# Patient Record
Sex: FEMALE | Race: BLACK OR AFRICAN AMERICAN | NOT HISPANIC OR LATINO | Employment: OTHER | URBAN - METROPOLITAN AREA
[De-identification: names, ages, dates, MRNs, and addresses within clinical notes are randomized per-mention and may not be internally consistent; named-entity substitution may affect disease eponyms.]

---

## 2020-09-02 ENCOUNTER — HOSPITAL ENCOUNTER (EMERGENCY)
Facility: HOSPITAL | Age: 85
Discharge: HOME | End: 2020-09-02
Attending: EMERGENCY MEDICINE | Admitting: EMERGENCY MEDICINE
Payer: MEDICARE

## 2020-09-02 VITALS
HEART RATE: 98 BPM | OXYGEN SATURATION: 96 % | TEMPERATURE: 98 F | BODY MASS INDEX: 24.2 KG/M2 | SYSTOLIC BLOOD PRESSURE: 110 MMHG | WEIGHT: 131.5 LBS | DIASTOLIC BLOOD PRESSURE: 74 MMHG | RESPIRATION RATE: 16 BRPM | HEIGHT: 62 IN

## 2020-09-02 DIAGNOSIS — R11.2 NON-INTRACTABLE VOMITING WITH NAUSEA, UNSPECIFIED VOMITING TYPE: Primary | ICD-10-CM

## 2020-09-02 DIAGNOSIS — R10.84 GENERALIZED ABDOMINAL PAIN: ICD-10-CM

## 2020-09-02 DIAGNOSIS — K63.89 MESENTERIC MASS: ICD-10-CM

## 2020-09-02 PROBLEM — R19.00 ABDOMINAL MASS: Status: ACTIVE | Noted: 2020-09-02

## 2020-09-02 PROBLEM — I48.91 ATRIAL FIBRILLATION: Status: ACTIVE | Noted: 2020-09-02

## 2020-09-02 LAB
ALBUMIN SERPL BCP-MCNC: 2.9 G/DL (ref 3.5–5.2)
ALP SERPL-CCNC: 133 U/L (ref 55–135)
ALT SERPL W/O P-5'-P-CCNC: 13 U/L (ref 10–44)
ANION GAP SERPL CALC-SCNC: 11 MMOL/L (ref 8–16)
ANISOCYTOSIS BLD QL SMEAR: SLIGHT
AST SERPL-CCNC: 27 U/L (ref 10–40)
BASOPHILS # BLD AUTO: 0.02 K/UL (ref 0–0.2)
BASOPHILS NFR BLD: 0.4 % (ref 0–1.9)
BILIRUB SERPL-MCNC: 1.5 MG/DL (ref 0.1–1)
BUN SERPL-MCNC: 15 MG/DL (ref 10–30)
BURR CELLS BLD QL SMEAR: ABNORMAL
CALCIUM SERPL-MCNC: 8.6 MG/DL (ref 8.7–10.5)
CHLORIDE SERPL-SCNC: 102 MMOL/L (ref 95–110)
CO2 SERPL-SCNC: 21 MMOL/L (ref 23–29)
CREAT SERPL-MCNC: 1 MG/DL (ref 0.5–1.4)
DACRYOCYTES BLD QL SMEAR: ABNORMAL
DIFFERENTIAL METHOD: ABNORMAL
EOSINOPHIL # BLD AUTO: 0 K/UL (ref 0–0.5)
EOSINOPHIL NFR BLD: 0.8 % (ref 0–8)
ERYTHROCYTE [DISTWIDTH] IN BLOOD BY AUTOMATED COUNT: 13.2 % (ref 11.5–14.5)
EST. GFR  (AFRICAN AMERICAN): 55 ML/MIN/1.73 M^2
EST. GFR  (NON AFRICAN AMERICAN): 48 ML/MIN/1.73 M^2
GLUCOSE SERPL-MCNC: 79 MG/DL (ref 70–110)
HCT VFR BLD AUTO: 33.2 % (ref 37–48.5)
HGB BLD-MCNC: 10.7 G/DL (ref 12–16)
HYPOCHROMIA BLD QL SMEAR: ABNORMAL
IMM GRANULOCYTES # BLD AUTO: 0.02 K/UL (ref 0–0.04)
IMM GRANULOCYTES NFR BLD AUTO: 0.4 % (ref 0–0.5)
LIPASE SERPL-CCNC: 10 U/L (ref 4–60)
LYMPHOCYTES # BLD AUTO: 0.8 K/UL (ref 1–4.8)
LYMPHOCYTES NFR BLD: 16.9 % (ref 18–48)
MCH RBC QN AUTO: 29.2 PG (ref 27–31)
MCHC RBC AUTO-ENTMCNC: 32.2 G/DL (ref 32–36)
MCV RBC AUTO: 91 FL (ref 82–98)
MONOCYTES # BLD AUTO: 0.5 K/UL (ref 0.3–1)
MONOCYTES NFR BLD: 9.5 % (ref 4–15)
NEUTROPHILS # BLD AUTO: 3.4 K/UL (ref 1.8–7.7)
NEUTROPHILS NFR BLD: 72 % (ref 38–73)
NRBC BLD-RTO: 0 /100 WBC
OVALOCYTES BLD QL SMEAR: ABNORMAL
PLATELET # BLD AUTO: 106 K/UL (ref 150–350)
PLATELET BLD QL SMEAR: ABNORMAL
PMV BLD AUTO: 12.9 FL (ref 9.2–12.9)
POIKILOCYTOSIS BLD QL SMEAR: SLIGHT
POTASSIUM SERPL-SCNC: 3.9 MMOL/L (ref 3.5–5.1)
PROT SERPL-MCNC: 6.8 G/DL (ref 6–8.4)
RBC # BLD AUTO: 3.66 M/UL (ref 4–5.4)
SODIUM SERPL-SCNC: 134 MMOL/L (ref 136–145)
TARGETS BLD QL SMEAR: ABNORMAL
WBC # BLD AUTO: 4.72 K/UL (ref 3.9–12.7)

## 2020-09-02 PROCEDURE — A9698 NON-RAD CONTRAST MATERIALNOC: HCPCS | Performed by: EMERGENCY MEDICINE

## 2020-09-02 PROCEDURE — 83690 ASSAY OF LIPASE: CPT

## 2020-09-02 PROCEDURE — 99285 EMERGENCY DEPT VISIT HI MDM: CPT | Mod: 25

## 2020-09-02 PROCEDURE — 99284 PR EMERGENCY DEPT VISIT,LEVEL IV: ICD-10-PCS | Mod: ,,, | Performed by: SURGERY

## 2020-09-02 PROCEDURE — 63600175 PHARM REV CODE 636 W HCPCS: Performed by: EMERGENCY MEDICINE

## 2020-09-02 PROCEDURE — 96375 TX/PRO/DX INJ NEW DRUG ADDON: CPT

## 2020-09-02 PROCEDURE — 96376 TX/PRO/DX INJ SAME DRUG ADON: CPT

## 2020-09-02 PROCEDURE — 25000003 PHARM REV CODE 250: Performed by: EMERGENCY MEDICINE

## 2020-09-02 PROCEDURE — 25500020 PHARM REV CODE 255: Performed by: EMERGENCY MEDICINE

## 2020-09-02 PROCEDURE — 85025 COMPLETE CBC W/AUTO DIFF WBC: CPT

## 2020-09-02 PROCEDURE — 96361 HYDRATE IV INFUSION ADD-ON: CPT

## 2020-09-02 PROCEDURE — 80053 COMPREHEN METABOLIC PANEL: CPT

## 2020-09-02 PROCEDURE — 99284 EMERGENCY DEPT VISIT MOD MDM: CPT | Mod: ,,, | Performed by: SURGERY

## 2020-09-02 PROCEDURE — 96374 THER/PROPH/DIAG INJ IV PUSH: CPT

## 2020-09-02 RX ORDER — FUROSEMIDE 20 MG/1
20 TABLET ORAL 2 TIMES DAILY
COMMUNITY

## 2020-09-02 RX ORDER — METOPROLOL SUCCINATE 25 MG/1
25 TABLET, EXTENDED RELEASE ORAL DAILY
COMMUNITY

## 2020-09-02 RX ORDER — PANTOPRAZOLE SODIUM 40 MG/1
40 TABLET, DELAYED RELEASE ORAL DAILY
COMMUNITY

## 2020-09-02 RX ORDER — MORPHINE SULFATE 4 MG/ML
4 INJECTION, SOLUTION INTRAMUSCULAR; INTRAVENOUS
Status: COMPLETED | OUTPATIENT
Start: 2020-09-02 | End: 2020-09-02

## 2020-09-02 RX ORDER — ONDANSETRON 2 MG/ML
4 INJECTION INTRAMUSCULAR; INTRAVENOUS
Status: COMPLETED | OUTPATIENT
Start: 2020-09-02 | End: 2020-09-02

## 2020-09-02 RX ORDER — ONDANSETRON 4 MG/1
4 TABLET, FILM COATED ORAL EVERY 6 HOURS
Qty: 30 TABLET | Refills: 0 | Status: SHIPPED | OUTPATIENT
Start: 2020-09-02

## 2020-09-02 RX ORDER — MORPHINE SULFATE 4 MG/ML
2 INJECTION, SOLUTION INTRAMUSCULAR; INTRAVENOUS
Status: DISCONTINUED | OUTPATIENT
Start: 2020-09-02 | End: 2020-09-02 | Stop reason: HOSPADM

## 2020-09-02 RX ORDER — HYDROCODONE BITARTRATE AND ACETAMINOPHEN 5; 325 MG/1; MG/1
1 TABLET ORAL EVERY 4 HOURS PRN
Qty: 30 TABLET | Refills: 0 | Status: SHIPPED | OUTPATIENT
Start: 2020-09-02

## 2020-09-02 RX ADMIN — IOHEXOL 1000 ML: 9 SOLUTION ORAL at 04:09

## 2020-09-02 RX ADMIN — ONDANSETRON 4 MG: 2 INJECTION INTRAMUSCULAR; INTRAVENOUS at 12:09

## 2020-09-02 RX ADMIN — MORPHINE SULFATE 4 MG: 4 INJECTION, SOLUTION INTRAMUSCULAR; INTRAVENOUS at 12:09

## 2020-09-02 RX ADMIN — ONDANSETRON 4 MG: 2 INJECTION INTRAMUSCULAR; INTRAVENOUS at 04:09

## 2020-09-02 RX ADMIN — SODIUM CHLORIDE 500 ML: 0.9 INJECTION, SOLUTION INTRAVENOUS at 04:09

## 2020-09-02 NOTE — ED NOTES
Patient states she contacted her daughter and they are attempting to contact her son to come pick her up. She ambulated to bathroom but states abdomen is starting to hurt again.

## 2020-09-02 NOTE — HOSPITAL COURSE
Patient was evaluated here with a CT scan.  The hospital Medicine service requested a surgical consult because of her history of a nonresectable intra-abdominal tumor

## 2020-09-02 NOTE — ED PROVIDER NOTES
SCRIBE #1 NOTE: I, Jaime Gaines, am scribing for, and in the presence of, Isaias Brooks Jr., MD. I have scribed the HPI, ROS, PEx.     SCRIBE #2 NOTE: I, Jessie Nathan, am scribing for, and in the presence of,  Demetrius Simmons Do, MD. I have scribed the remaining portions of the note not scribed by Scribe #1.      History     Chief Complaint   Patient presents with    Abdominal Pain     NVx 3-5 days     Review of patient's allergies indicates:  No Known Allergies      History of Present Illness     HPI    9/2/2020, 4:32 AM  History obtained from the EMS and patient      History of Present Illness: Lauryn Pineda is a 89 y.o. female patient who presents to the Emergency Department for evaluation of abdominal pain which onset 3-5 days ago. Pt reports going into A-fib last week.  Pt reports she has not had a bowel movement in several days. Pt was transferred to this ED from [facility] for a CT abdomen. Concern of bowel obstruction. Symptoms are constant and moderate in severity. No mitigating or exacerbating factors reported. Associated sxs include n/v. Patient denies any fever, chills, CP, SOB, back pain, dysuria, and all other sxs at this time. No further complaints or concerns at this time.  While at the other hospital, the patient had an episode of atrial fibrillation with rapid ventricular response.  The patient has a prior history of atrial fibrillation.  Patient also has a history of a nonoperable mass in her abdomen for many years that is not cancer per patient.      Arrival mode: [facility]I    PCP: Billy Black MD        Past Medical History:  Past Medical History:   Diagnosis Date    A-fib     HTN (hypertension)        Past Surgical History:  History reviewed. No pertinent surgical history.      Family History:  History reviewed. No pertinent family history.    Social History:  History reviewed. No pertinent social history.      Review of Systems     Review of Systems    Constitutional: Negative for activity change, appetite change, chills, diaphoresis and fever.   HENT: Negative for congestion, drooling, ear pain, mouth sores, rhinorrhea, sinus pain, sore throat and trouble swallowing.    Eyes: Negative for pain and discharge.   Respiratory: Negative for cough, chest tightness, shortness of breath, wheezing and stridor.    Cardiovascular: Negative for chest pain, palpitations and leg swelling.   Gastrointestinal: Positive for abdominal pain, constipation, nausea and vomiting. Negative for abdominal distention, blood in stool and diarrhea.   Genitourinary: Negative for difficulty urinating, dysuria, flank pain, frequency, hematuria and urgency.   Musculoskeletal: Negative for arthralgias, back pain and myalgias.   Skin: Negative for pallor, rash and wound.   Neurological: Negative for dizziness, syncope, weakness, light-headedness and numbness.   All other systems reviewed and are negative.       Physical Exam     Initial Vitals [09/02/20 0422]   BP Pulse Resp Temp SpO2   119/78 101 18 98.7 °F (37.1 °C) 100 %      MAP       --          Physical Exam  Nursing Notes and Vital Signs Reviewed.  Constitutional: Patient is in no acute distress. Well-developed and well-nourished.  Head: Atraumatic. Normocephalic.  Eyes: PERRL. EOM intact. Conjunctivae are not pale. No scleral icterus.  ENT: Mucous membranes are moist. Oropharynx is clear and symmetric.    Neck: Supple. Full ROM. No lymphadenopathy.  Cardiovascular: Regular rate. Regular rhythm. No murmurs, rubs, or gallops. Distal pulses are 2+ and symmetric.  Pulmonary/Chest: No respiratory distress. Clear to auscultation bilaterally. No wheezing or rales.  Abdominal: Soft and non-distended.  There is mild generalized tenderness.  No rebound, guarding, or rigidity. Good bowel sounds.  Genitourinary: No CVA tenderness.  No suprapubic tenderness  Musculoskeletal: Moves all extremities. No obvious deformities. No edema. No calf  "tenderness.  Skin: Warm and dry.  No rash or cellulitis.  Neurological:  Alert, awake, and appropriate.  Normal speech.  No acute focal neurological deficits are appreciated.  Psychiatric: Normal affect. Good eye contact. Appropriate in content.     ED Course   Procedures  ED Vital Signs:  Vitals:    09/02/20 0422 09/02/20 0431 09/02/20 0456 09/02/20 0500   BP: 119/78   117/77   Pulse: 101   103   Resp: 18  18 18   Temp: 98.7 °F (37.1 °C)      TempSrc: Oral      SpO2: 100%   100%   Weight:  59.6 kg (131 lb 8 oz)     Height: 5' 2" (1.575 m)       09/02/20 0600 09/02/20 0901 09/02/20 0917 09/02/20 1222   BP: 128/85 117/70     Pulse: 99 96 100    Resp: 16   14   Temp:       TempSrc:       SpO2: 96% 99% 96%    Weight:       Height:        09/02/20 1226   BP: 108/71   Pulse: 102   Resp: 14   Temp:    TempSrc:    SpO2: 96%   Weight:    Height:        Abnormal Lab Results:  Labs Reviewed   CBC W/ AUTO DIFFERENTIAL - Abnormal; Notable for the following components:       Result Value    RBC 3.66 (*)     Hemoglobin 10.7 (*)     Hematocrit 33.2 (*)     Platelets 106 (*)     Lymph # 0.8 (*)     Lymph% 16.9 (*)     All other components within normal limits   COMPREHENSIVE METABOLIC PANEL - Abnormal; Notable for the following components:    Sodium 134 (*)     CO2 21 (*)     Calcium 8.6 (*)     Albumin 2.9 (*)     Total Bilirubin 1.5 (*)     eGFR if  55 (*)     eGFR if non  48 (*)     All other components within normal limits   LIPASE        All Lab Results:  Results for orders placed or performed during the hospital encounter of 09/02/20   CBC auto differential   Result Value Ref Range    WBC 4.72 3.90 - 12.70 K/uL    RBC 3.66 (L) 4.00 - 5.40 M/uL    Hemoglobin 10.7 (L) 12.0 - 16.0 g/dL    Hematocrit 33.2 (L) 37.0 - 48.5 %    Mean Corpuscular Volume 91 82 - 98 fL    Mean Corpuscular Hemoglobin 29.2 27.0 - 31.0 pg    Mean Corpuscular Hemoglobin Conc 32.2 32.0 - 36.0 g/dL    RDW 13.2 11.5 - 14.5 %    " Platelets 106 (L) 150 - 350 K/uL    MPV 12.9 9.2 - 12.9 fL    Immature Granulocytes 0.4 0.0 - 0.5 %    Gran # (ANC) 3.4 1.8 - 7.7 K/uL    Immature Grans (Abs) 0.02 0.00 - 0.04 K/uL    Lymph # 0.8 (L) 1.0 - 4.8 K/uL    Mono # 0.5 0.3 - 1.0 K/uL    Eos # 0.0 0.0 - 0.5 K/uL    Baso # 0.02 0.00 - 0.20 K/uL    nRBC 0 0 /100 WBC    Gran% 72.0 38.0 - 73.0 %    Lymph% 16.9 (L) 18.0 - 48.0 %    Mono% 9.5 4.0 - 15.0 %    Eosinophil% 0.8 0.0 - 8.0 %    Basophil% 0.4 0.0 - 1.9 %    Platelet Estimate Appears normal     Aniso Slight     Poik Slight     Hypo Occasional     Ovalocytes Occasional     Target Cells Occasional     Tear Drop Cells Occasional     Aston Cells Occasional     Differential Method Automated    Comprehensive metabolic panel   Result Value Ref Range    Sodium 134 (L) 136 - 145 mmol/L    Potassium 3.9 3.5 - 5.1 mmol/L    Chloride 102 95 - 110 mmol/L    CO2 21 (L) 23 - 29 mmol/L    Glucose 79 70 - 110 mg/dL    BUN, Bld 15 10 - 30 mg/dL    Creatinine 1.0 0.5 - 1.4 mg/dL    Calcium 8.6 (L) 8.7 - 10.5 mg/dL    Total Protein 6.8 6.0 - 8.4 g/dL    Albumin 2.9 (L) 3.5 - 5.2 g/dL    Total Bilirubin 1.5 (H) 0.1 - 1.0 mg/dL    Alkaline Phosphatase 133 55 - 135 U/L    AST 27 10 - 40 U/L    ALT 13 10 - 44 U/L    Anion Gap 11 8 - 16 mmol/L    eGFR if African American 55 (A) >60 mL/min/1.73 m^2    eGFR if non African American 48 (A) >60 mL/min/1.73 m^2   Lipase   Result Value Ref Range    Lipase 10 4 - 60 U/L         Imaging Results:  Imaging Results          CT Abdomen Pelvis  Without Contrast (Final result)  Result time 09/02/20 07:32:08    Final result by Saturnino Erazo MD (09/02/20 07:32:08)                 Impression:      Mesenteric mass noted measuring is 3.9 x 2.8 cm 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    Unclear involvement or invasion of the portal vein. Prominence in the region of the pancreatic head is noted thought to reflect extension of the mass or adenopathy. Extensive lymph nodes are seen throughout the  mesentery.  Recommend follow-up with IV contrast.    Moderate mucosal thickening throughout small bowel loops within the mid and lower abdomen with prominence of proximal small bowel loops concerning for early bowel obstruction related to mesenteric mass.  Mucosal thickening can be seen with inflammation or infiltration.    All CT scans at this facility use dose modulation, iterative reconstruction, and/or weight based dosing when appropriate to reduce radiation dose to as low as reasonable achievable.      Electronically signed by: Saturnino Erazo MD  Date:    09/02/2020  Time:    07:32             Narrative:    EXAMINATION:  CT ABDOMEN PELVIS WITHOUT CONTRAST    CLINICAL HISTORY:  Bowel obstruction high-grade suspected;    TECHNIQUE:  Low dose axial images, sagittal and coronal reformations were obtained from the lung bases to the pubic symphysis.  30 mL of oral Omnipaque 350 was administered.    COMPARISON:  None    FINDINGS:  Heart: Cardiomegaly.  Aortic valve and mitral annular calcifications.  No pericardial effusion.    Lung Bases: Interlobular septal thickening and small right and trace left pleural effusions.  Findings concerning for mild pulmonary edema.  Scarring and bronchiectasis noted at the right lung base.    Liver: Liver is normal in size.    Gallbladder: Mild wall thickening.  Layering stones or sludge suspected.    Bile Ducts: No dilatation.    Pancreas: No obvious mass. No peripancreatic fat stranding.    Spleen: Normal.    Adrenals: Normal.    Kidneys/Ureters: Multiple indeterminate hypodensities within the kidneys measuring up to 1.6 cm on the left.  Recommend follow-up ultrasound.  No mass, hydroureteronephrosis, or nephroureterolithiasis.    Bladder: No wall thickening.    GI Tract/Mesentery: Small hiatal hernia.  Moderate mucosal thickening throughout small bowel loops within the mid and lower abdomen with prominence of proximal small bowel loops concerning for early bowel obstruction.   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  Unclear involvement or invasion of the portal vein.  Prominence in the region of the pancreatic head is noted thought to reflect extension of the mass or adenopathy.  Extensive lymph nodes are seen throughout the mesentery.  Moderate constipation is seen within the colon.    Peritoneal Space: Moderate amount of ascites scattered throughout the abdomen.    Retroperitoneum: No significant adenopathy.    Abdominal wall: Mild diffuse anasarca.    Vasculature: No aneurysm. Aorta demonstrates severe atherosclerotic disease. Tortuosity of the descending aorta can be seen with chronic hypertension.    Bones: No acute fracture.  Diffuse osteopenia with heterogeneous attenuation throughout..  Advanced degenerative changes and diffuse osteopenia throughout the visualized spine.  Posterior disc bulges noted from L3/4 through L5/S1 producing mild canal stenosis and mild to moderate bilateral neural foraminal narrowing at each level.  No suspicious lytic or sclerotic lesions.                                          The Emergency Provider reviewed the vital signs and test results, which are outlined above.     ED Discussion       6:00 AM: Dr. Brooks transfers care of patient to Dr. Georges pending imaging results.    8:30 AM: Re-evaluated pt. Pt is resting comfortably and is in no acute distress. D/w pt all pertinent results. D/w pt any concerns expressed at this time. Answered all questions. Pt expresses understanding at this time.    8:50 AM: Discussed pt's case with Dr. Bill (hospital medicine) who recommends consulting general surgery for evaluation and treatment recommendations.    9:35 AM: Dr. aRpp (general surgery) will evaluate pt at bedside.    11:18 AM Dr. Rapp is evaluating pt at bedside.     11:25 AM  Dr. Rapp states that pt has an unchanged, nonresectable tumor in which he cannot operate on. He does not think is causing obstruction  "but she does have some thickened bowel wall. He advises giving the pt clear fluids and contacting hospital medicine. Will notify hospital medicine and suggest GI consult and PO challenge. Per Dr. Rapp, " The tumor was unresectable and remains unresectable and unless she has another tremaine indication for surgery there are no plans for any surgical procedures during this admission."    11:46 AM: Re-evaluated pt. Pt is resting comfortably and is in no acute distress. D/w pt all pertinent results.  Pt was able to tolerate contrast with no complications or vomiting. Pt does not believe or wants surgery. She is aware of the severity of her condition. Pt states that she realizes the tumor may increase in size and cause more severe complications, including potential death. D/w pt any concerns expressed at this time. Answered all questions. Pt expresses understanding at this time. Pt states that she is ready to go home.    12:15 AM Pt is wailing in ED room. Pt reports increased abdominal pain after attempting to use the restroom. Will order pain medication.  However, she wants to go home. She does not want surgery.      2:51 PM  Patient is still resting comfortably.  She is no longer in any abdominal pain.  She was able to eat a diet and has not been vomiting.  We are still waiting for her son to get here from [place]         Medical Decision Making:   Clinical Tests:   Lab Tests: Ordered and Reviewed  Radiological Study: Ordered and Reviewed           ED Medication(s):  Medications   morphine injection 2 mg (2 mg Intravenous Not Given 9/2/20 0456)   sodium chloride 0.9% bolus 500 mL (0 mLs Intravenous Stopped 9/2/20 0912)   ondansetron injection 4 mg (4 mg Intravenous Given 9/2/20 0456)   iohexoL (OMNIPAQUE 9) oral solution 1,000 mL (1,000 mLs Oral Given 9/2/20 0450)   morphine injection 4 mg (4 mg Intravenous Given 9/2/20 1222)   ondansetron injection 4 mg (4 mg Intravenous Given 9/2/20 1223)       New " Prescriptions    No medications on file               Scribe Attestation:   Scribe #1: I performed the above scribed service and the documentation accurately describes the services I performed. I attest to the accuracy of the note.     Attending:   Physician Attestation Statement for Scribe #1: I, Isaias Brooks Jr., MD, personally performed the services described in this documentation, as scribed by Jaime Gaines, in my presence, and it is both accurate and complete.       Scribe Attestation:   Scribe #2: I performed the above scribed service and the documentation accurately describes the services I performed. I attest to the accuracy of the note.    Attending Attestation:           Physician Attestation for Scribe:    Physician Attestation Statement for Scribe #2: I, Demetrius Simmons Do, MD, reviewed documentation, as scribed by Jessie Nathan in my presence, and it is both accurate and complete. I also acknowledge and confirm the content of the note done by Scribe #1.           Clinical Impression       ICD-10-CM ICD-9-CM   1. Non-intractable vomiting with nausea, unspecified vomiting type  R11.2 787.01   2. Generalized abdominal pain  R10.84 789.07   3. Mesenteric mass  K63.89 568.89       Disposition:   Disposition: Discharged  Condition: Stable         Demetrius Simmons Do, MD  09/02/20 1544

## 2020-09-02 NOTE — CONSULTS
[facility] -   General Surgery  Consult Note    Patient Name: Lauryn Pineda  MRN: [id number]  Code Status: No Order  Admission Date: 9/2/2020  Hospital Length of Stay: 0 days  Attending Physician: Demetrius Simmons Do, MD  Primary Care Provider: Billy Black MD    Patient information was obtained from patient, Records from [place] and ER records.         In summary    Surgical consult for nonresectable mass.  There is not appear to be tremaine evidence of a bowel obstruction.  Would recommend clear liquid diet and repeat imaging.    No plans for surgery unless the patient has a no other tremaine indication for operative intervention other than her non resect abdominal mass    Consults  Subjective:     Principal Problem: <principal problem not specified>    History of Present Illness: Patient was transferred to our emergency room from the emergency room in [place].  She presented to the emergency room In [place]  with a 3 day history of lower abdominal pain.  She says that the pain is crampy in nature.  It is associated with nausea but no vomiting at this time.  She has had intermittent episodes of abdominal pain over the years that have been associated with nausea and vomiting.  These usually resolve spontaneously.    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  She was evaluated in [place] approximately 3 years ago and it was determined that the tumor should not be resected because of the nature in the tumor in her age and comorbidities    No current facility-administered medications on file prior to encounter.      Current Outpatient Medications on File Prior to Encounter   Medication Sig    furosemide (LASIX) 20 MG tablet Take 20 mg by mouth 2 (two) times daily.    metoprolol succinate (TOPROL-XL) 25 MG 24 hr tablet Take 25 mg by mouth once daily.    pantoprazole (PROTONIX) 40 MG tablet Take 40 mg by mouth once daily.       Review of  patient's allergies indicates:  No Known Allergies    Past Medical History:   Diagnosis Date    A-fib     HTN (hypertension)      History reviewed. No pertinent surgical history.  Family History     None        Tobacco Use    Smoking status: Not on file   Substance and Sexual Activity    Alcohol use: Not on file    Drug use: Not on file    Sexual activity: Not on file     Review of Systems   Constitutional: Positive for fatigue. Negative for appetite change, chills, fever and unexpected weight change.   HENT: Negative for hearing loss and rhinorrhea.    Eyes: Negative for visual disturbance.   Respiratory: Negative for apnea, cough, shortness of breath and wheezing.    Cardiovascular: Positive for leg swelling. Negative for chest pain and palpitations.   Gastrointestinal: Positive for abdominal pain, constipation and nausea. Negative for abdominal distention, blood in stool, diarrhea and vomiting.   Genitourinary: Negative for dysuria, frequency and urgency.   Musculoskeletal: Negative for arthralgias and neck pain.   Skin: Negative for rash.   Neurological: Negative for seizures, weakness, numbness and headaches.   Hematological: Negative for adenopathy. Does not bruise/bleed easily.   Psychiatric/Behavioral: Negative for hallucinations. The patient is not nervous/anxious.      Objective:     Vital Signs (Most Recent):  Temp: 98.7 °F (37.1 °C) (09/02/20 0422)  Pulse: 100 (09/02/20 0917)  Resp: 16 (09/02/20 0600)  BP: 117/70 (09/02/20 0901)  SpO2: 96 % (09/02/20 0917) Vital Signs (24h Range):  Temp:  [98.6 °F (37 °C)-98.7 °F (37.1 °C)] 98.7 °F (37.1 °C)  Pulse:  [] 100  Resp:  [16-18] 16  SpO2:  [96 %-100 %] 96 %  BP: (117-128)/(70-87) 117/70     Weight: 59.6 kg (131 lb 8 oz)  Body mass index is 24.05 kg/m².    Physical Exam  Vitals signs and nursing note reviewed.   Constitutional:       Comments: Frail somewhat cachectic   HENT:      Head:      Comments: Temporal wasting     Nose: Nose normal.   Eyes:       General: No scleral icterus.     Pupils: Pupils are equal, round, and reactive to light.   Cardiovascular:      Rate and Rhythm: Normal rate and regular rhythm.      Pulses: Normal pulses.   Pulmonary:      Effort: Pulmonary effort is normal.      Breath sounds: Normal breath sounds.   Abdominal:      General: Abdomen is flat. Bowel sounds are normal. There is no distension.      Palpations: Abdomen is soft.      Tenderness: There is no abdominal tenderness.      Hernia: No hernia is present.   Musculoskeletal:      Right lower leg: Edema (With the appearance of venous stasis changes) present.      Left lower leg: Edema ( with the appearance of venous stasis change) present.   Skin:     General: Skin is warm and dry.   Neurological:      Mental Status: She is alert.         Significant Labs:  CBC:   Recent Labs   Lab 09/02/20 0452   WBC 4.72   RBC 3.66*   HGB 10.7*   HCT 33.2*   *   MCV 91   MCH 29.2   MCHC 32.2     BMP:   Recent Labs   Lab 09/02/20 0452   GLU 79   *   K 3.9      CO2 21*   BUN 15   CREATININE 1.0   CALCIUM 8.6*     CMP:   Recent Labs   Lab 09/02/20  0452   GLU 79   CALCIUM 8.6*   ALBUMIN 2.9*   PROT 6.8   *   K 3.9   CO2 21*      BUN 15   CREATININE 1.0   ALKPHOS 133   ALT 13   AST 27   BILITOT 1.5*       Significant Diagnostics:      CT ABDOMEN PELVIS WITHOUT CONTRAST     CLINICAL HISTORY:  Bowel obstruction high-grade suspected;     TECHNIQUE:  Low dose axial images, sagittal and coronal reformations were obtained from the lung bases to the pubic symphysis.  30 mL of oral Omnipaque 350 was administered.     COMPARISON:  None     FINDINGS:  Heart: Cardiomegaly.  Aortic valve and mitral annular calcifications.  No pericardial effusion.     Lung Bases: Interlobular septal thickening and small right and trace left pleural effusions.  Findings concerning for mild pulmonary edema.  Scarring and bronchiectasis noted at the right lung base.     Liver: Liver is normal  in size.     Gallbladder: Mild wall thickening.  Layering stones or sludge suspected.     Bile Ducts: No dilatation.     Pancreas: No obvious mass. No peripancreatic fat stranding.     Spleen: Normal.     Adrenals: Normal.     Kidneys/Ureters: Multiple indeterminate hypodensities within the kidneys measuring up to 1.6 cm on the left.  Recommend follow-up ultrasound.  No mass, hydroureteronephrosis, or nephroureterolithiasis.     Bladder: No wall thickening.     GI Tract/Mesentery: Small hiatal hernia.  Moderate mucosal thickening throughout small bowel loops within the mid and lower abdomen with prominence of proximal small bowel loops concerning for early bowel obstruction.  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  Unclear involvement or invasion of the portal vein.  Prominence in the region of the pancreatic head is noted thought to reflect extension of the mass or adenopathy.  Extensive lymph nodes are seen throughout the mesentery.  Moderate constipation is seen within the colon.     Peritoneal Space: Moderate amount of ascites scattered throughout the abdomen.     Retroperitoneum: No significant adenopathy.     Abdominal wall: Mild diffuse anasarca.     Vasculature: No aneurysm. Aorta demonstrates severe atherosclerotic disease. Tortuosity of the descending aorta can be seen with chronic hypertension.     Bones: No acute fracture.  Diffuse osteopenia with heterogeneous attenuation throughout..  Advanced degenerative changes and diffuse osteopenia throughout the visualized spine.  Posterior disc bulges noted from L3/4 through L5/S1 producing mild canal stenosis and mild to moderate bilateral neural foraminal narrowing at each level.  No suspicious lytic or sclerotic lesions.     Impression:     Mesenteric mass noted measuring is 3.9 x 2.8 cm 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     Unclear involvement or invasion of the portal vein. Prominence in the region of the pancreatic  head is noted thought to reflect extension of the mass or adenopathy. Extensive lymph nodes are seen throughout the mesentery.  Recommend follow-up with IV contrast.     Moderate mucosal thickening throughout small bowel loops within the mid and lower abdomen with prominence of proximal small bowel loops concerning for early bowel obstruction related to mesenteric mass.  Mucosal thickening can be seen with inflammation or infiltration.     All CT scans at this facility use dose modulation, iterative reconstruction, and/or weight based dosing when appropriate to reduce radiation dose to as low as reasonable achievable.        Electronically signed by: Saturnino Erazo MD  Date:                                            09/02/2020    Assessment/Plan:     Atrial fibrillation  Atrial fibrillation was present on the EKG from [facility]    Abdominal mass  Patient was deemed to have a non receptacle intra-abdominal mass.  At this point I would agree with that assessment given the nature of the mass in her comorbidities.  There does not appear to be a tremaine bowel obstruction and no surgical intervention is recommended at this point.    I would recommend the patient be started on a clear liquid diet with abdominal films in the morning.  Biochemical evaluation for carcinoid and/or other sources of intra-abdominal masses can be ended taking if deemed necessary    Consideration can be given to a Gastroenterology consult.          VTE Risk Mitigation (From admission, onward)    None          Thank you for your consult. I will follow-up with patient. Please contact us if you have any additional questions.    Jason Rapp MD  General Surgery  [facility] -

## 2020-09-02 NOTE — SUBJECTIVE & OBJECTIVE
No current facility-administered medications on file prior to encounter.      Current Outpatient Medications on File Prior to Encounter   Medication Sig    furosemide (LASIX) 20 MG tablet Take 20 mg by mouth 2 (two) times daily.    metoprolol succinate (TOPROL-XL) 25 MG 24 hr tablet Take 25 mg by mouth once daily.    pantoprazole (PROTONIX) 40 MG tablet Take 40 mg by mouth once daily.       Review of patient's allergies indicates:  No Known Allergies    Past Medical History:   Diagnosis Date    A-fib     HTN (hypertension)      History reviewed. No pertinent surgical history.  Family History     None        Tobacco Use    Smoking status: Not on file   Substance and Sexual Activity    Alcohol use: Not on file    Drug use: Not on file    Sexual activity: Not on file     Review of Systems   Constitutional: Positive for fatigue. Negative for appetite change, chills, fever and unexpected weight change.   HENT: Negative for hearing loss and rhinorrhea.    Eyes: Negative for visual disturbance.   Respiratory: Negative for apnea, cough, shortness of breath and wheezing.    Cardiovascular: Positive for leg swelling. Negative for chest pain and palpitations.   Gastrointestinal: Positive for abdominal pain, constipation and nausea. Negative for abdominal distention, blood in stool, diarrhea and vomiting.   Genitourinary: Negative for dysuria, frequency and urgency.   Musculoskeletal: Negative for arthralgias and neck pain.   Skin: Negative for rash.   Neurological: Negative for seizures, weakness, numbness and headaches.   Hematological: Negative for adenopathy. Does not bruise/bleed easily.   Psychiatric/Behavioral: Negative for hallucinations. The patient is not nervous/anxious.      Objective:     Vital Signs (Most Recent):  Temp: 98.7 °F (37.1 °C) (09/02/20 0422)  Pulse: 100 (09/02/20 0917)  Resp: 16 (09/02/20 0600)  BP: 117/70 (09/02/20 0901)  SpO2: 96 % (09/02/20 0917) Vital Signs (24h Range):  Temp:  [98.6 °F  (37 °C)-98.7 °F (37.1 °C)] 98.7 °F (37.1 °C)  Pulse:  [] 100  Resp:  [16-18] 16  SpO2:  [96 %-100 %] 96 %  BP: (117-128)/(70-87) 117/70     Weight: 59.6 kg (131 lb 8 oz)  Body mass index is 24.05 kg/m².    Physical Exam  Vitals signs and nursing note reviewed.   Constitutional:       Comments: Frail somewhat cachectic   HENT:      Head:      Comments: Temporal wasting     Nose: Nose normal.   Eyes:      General: No scleral icterus.     Pupils: Pupils are equal, round, and reactive to light.   Cardiovascular:      Rate and Rhythm: Normal rate and regular rhythm.      Pulses: Normal pulses.   Pulmonary:      Effort: Pulmonary effort is normal.      Breath sounds: Normal breath sounds.   Abdominal:      General: Abdomen is flat. Bowel sounds are normal. There is no distension.      Palpations: Abdomen is soft.      Tenderness: There is no abdominal tenderness.      Hernia: No hernia is present.   Musculoskeletal:      Right lower leg: Edema (With the appearance of venous stasis changes) present.      Left lower leg: Edema ( with the appearance of venous stasis change) present.   Skin:     General: Skin is warm and dry.   Neurological:      Mental Status: She is alert.         Significant Labs:  CBC:   Recent Labs   Lab 09/02/20 0452   WBC 4.72   RBC 3.66*   HGB 10.7*   HCT 33.2*   *   MCV 91   MCH 29.2   MCHC 32.2     BMP:   Recent Labs   Lab 09/02/20  0452   GLU 79   *   K 3.9      CO2 21*   BUN 15   CREATININE 1.0   CALCIUM 8.6*     CMP:   Recent Labs   Lab 09/02/20  0452   GLU 79   CALCIUM 8.6*   ALBUMIN 2.9*   PROT 6.8   *   K 3.9   CO2 21*      BUN 15   CREATININE 1.0   ALKPHOS 133   ALT 13   AST 27   BILITOT 1.5*       Significant Diagnostics:      CT ABDOMEN PELVIS WITHOUT CONTRAST     CLINICAL HISTORY:  Bowel obstruction high-grade suspected;     TECHNIQUE:  Low dose axial images, sagittal and coronal reformations were obtained from the lung bases to the pubic symphysis.   30 mL of oral Omnipaque 350 was administered.     COMPARISON:  None     FINDINGS:  Heart: Cardiomegaly.  Aortic valve and mitral annular calcifications.  No pericardial effusion.     Lung Bases: Interlobular septal thickening and small right and trace left pleural effusions.  Findings concerning for mild pulmonary edema.  Scarring and bronchiectasis noted at the right lung base.     Liver: Liver is normal in size.     Gallbladder: Mild wall thickening.  Layering stones or sludge suspected.     Bile Ducts: No dilatation.     Pancreas: No obvious mass. No peripancreatic fat stranding.     Spleen: Normal.     Adrenals: Normal.     Kidneys/Ureters: Multiple indeterminate hypodensities within the kidneys measuring up to 1.6 cm on the left.  Recommend follow-up ultrasound.  No mass, hydroureteronephrosis, or nephroureterolithiasis.     Bladder: No wall thickening.     GI Tract/Mesentery: Small hiatal hernia.  Moderate mucosal thickening throughout small bowel loops within the mid and lower abdomen with prominence of proximal small bowel loops concerning for early bowel obstruction.  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  Unclear involvement or invasion of the portal vein.  Prominence in the region of the pancreatic head is noted thought to reflect extension of the mass or adenopathy.  Extensive lymph nodes are seen throughout the mesentery.  Moderate constipation is seen within the colon.     Peritoneal Space: Moderate amount of ascites scattered throughout the abdomen.     Retroperitoneum: No significant adenopathy.     Abdominal wall: Mild diffuse anasarca.     Vasculature: No aneurysm. Aorta demonstrates severe atherosclerotic disease. Tortuosity of the descending aorta can be seen with chronic hypertension.     Bones: No acute fracture.  Diffuse osteopenia with heterogeneous attenuation throughout..  Advanced degenerative changes and diffuse osteopenia throughout the visualized  spine.  Posterior disc bulges noted from L3/4 through L5/S1 producing mild canal stenosis and mild to moderate bilateral neural foraminal narrowing at each level.  No suspicious lytic or sclerotic lesions.     Impression:     Mesenteric mass noted measuring is 3.9 x 2.8 cm 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     Unclear involvement or invasion of the portal vein. Prominence in the region of the pancreatic head is noted thought to reflect extension of the mass or adenopathy. Extensive lymph nodes are seen throughout the mesentery.  Recommend follow-up with IV contrast.     Moderate mucosal thickening throughout small bowel loops within the mid and lower abdomen with prominence of proximal small bowel loops concerning for early bowel obstruction related to mesenteric mass.  Mucosal thickening can be seen with inflammation or infiltration.     All CT scans at this facility use dose modulation, iterative reconstruction, and/or weight based dosing when appropriate to reduce radiation dose to as low as reasonable achievable.        Electronically signed by: Saturnino Erazo MD  Date:                                            09/02/2020

## 2020-09-02 NOTE — HPI
Patient was transferred to our emergency room from the emergency room in [place].  She presented to the emergency room In [place]  with a 3 day history of lower abdominal pain.  She says that the pain is crampy in nature.  It is associated with nausea but no vomiting at this time.  She has had intermittent episodes of abdominal pain over the years that have been associated with nausea and vomiting.  These usually resolve spontaneously.    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  She was evaluated in [place] approximately 3 years ago and it was determined that the tumor should not be resected because of the nature in the tumor in her age and comorbidities

## 2020-09-02 NOTE — ASSESSMENT & PLAN NOTE
Patient was deemed to have a non receptacle intra-abdominal mass.  At this point I would agree with that assessment given the nature of the mass in her comorbidities.  There does not appear to be a tremaine bowel obstruction and no surgical intervention is recommended at this point.    I would recommend the patient be started on a clear liquid diet with abdominal films in the morning.  Biochemical evaluation for carcinoid and/or other sources of intra-abdominal masses can be ended taking if deemed necessary    Consideration can be given to a Gastroenterology consult.